# Patient Record
Sex: MALE | URBAN - METROPOLITAN AREA
[De-identification: names, ages, dates, MRNs, and addresses within clinical notes are randomized per-mention and may not be internally consistent; named-entity substitution may affect disease eponyms.]

---

## 2020-10-14 ENCOUNTER — OFFICE VISIT (OUTPATIENT)
Dept: URBAN - METROPOLITAN AREA CLINIC 16 | Facility: CLINIC | Age: 85
End: 2020-10-14
Payer: MEDICARE

## 2020-10-14 DIAGNOSIS — H25.11 AGE-RELATED NUCLEAR CATARACT, RIGHT EYE: Primary | ICD-10-CM

## 2020-10-14 DIAGNOSIS — H52.4 PRESBYOPIA: ICD-10-CM

## 2020-10-14 DIAGNOSIS — H35.349 MACULAR HOLE: ICD-10-CM

## 2020-10-14 DIAGNOSIS — Z96.1 PRESENCE OF INTRAOCULAR LENS: ICD-10-CM

## 2020-10-14 PROCEDURE — 92004 COMPRE OPH EXAM NEW PT 1/>: CPT | Performed by: OPTOMETRIST

## 2020-10-14 ASSESSMENT — VISUAL ACUITY
OS: 20/70
OD: 20/20

## 2020-10-14 ASSESSMENT — INTRAOCULAR PRESSURE
OD: 12
OS: 12

## 2020-10-14 NOTE — IMPRESSION/PLAN
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 Will continue to observe.

## 2020-10-14 NOTE — IMPRESSION/PLAN
Impression: Age-related nuclear cataract, right eye: H25.11. Plan: Discussed diagnosis. Will continue to observe.

## 2020-10-14 NOTE — IMPRESSION/PLAN
Impression: Presence of intraocular lens: Z96.1. Left. Plan: Discussed diagnosis. Will continue to observe.

## 2023-03-02 ENCOUNTER — OFFICE VISIT (OUTPATIENT)
Dept: URBAN - METROPOLITAN AREA CLINIC 26 | Facility: CLINIC | Age: 88
End: 2023-03-02
Payer: MEDICARE

## 2023-03-02 DIAGNOSIS — H04.123 DRY EYE SYNDROME OF BILATERAL LACRIMAL GLANDS: ICD-10-CM

## 2023-03-02 DIAGNOSIS — H35.349 MACULAR HOLE: ICD-10-CM

## 2023-03-02 DIAGNOSIS — H25.811 COMBINED FORMS OF AGE-RELATED CATARACT, RIGHT EYE: Primary | ICD-10-CM

## 2023-03-02 PROCEDURE — 99204 OFFICE O/P NEW MOD 45 MIN: CPT | Performed by: OPTOMETRIST

## 2023-03-02 PROCEDURE — 92134 CPTRZ OPH DX IMG PST SGM RTA: CPT | Performed by: OPTOMETRIST

## 2023-03-02 ASSESSMENT — INTRAOCULAR PRESSURE
OD: 13
OS: 15

## 2023-03-02 ASSESSMENT — VISUAL ACUITY
OD: 20/40
OS: 20/100

## 2023-03-02 NOTE — IMPRESSION/PLAN
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 Discussed diagnosis. Will continue to observe.

## 2023-03-02 NOTE — IMPRESSION/PLAN
Impression: Combined forms of age-related cataract, right eye: H25.811. Plan:  Discussed cataracts with patient. Discussed treatment options. Surgical treatment is recommended. Surgical risks and benefits discussed. Patient elects surgical treatment. Recommend surgery OD.  Patient is candidate/interested in standard lens,

## 2023-04-27 ENCOUNTER — TESTING ONLY (OUTPATIENT)
Dept: URBAN - METROPOLITAN AREA CLINIC 24 | Facility: CLINIC | Age: 88
End: 2023-04-27
Payer: MEDICARE

## 2023-04-27 DIAGNOSIS — Z01.818 ENCOUNTER FOR OTHER PREPROCEDURAL EXAMINATION: Primary | ICD-10-CM

## 2023-04-27 DIAGNOSIS — H25.811 COMBINED FORMS OF AGE-RELATED CATARACT, RIGHT EYE: Primary | ICD-10-CM

## 2023-04-27 PROCEDURE — 99203 OFFICE O/P NEW LOW 30 MIN: CPT | Performed by: PHYSICIAN ASSISTANT

## 2023-04-27 ASSESSMENT — PACHYMETRY
OS: 3.68
OS: 23.34
OD: 2.91
OD: 23.34

## 2023-05-03 ENCOUNTER — PRE-OPERATIVE VISIT (OUTPATIENT)
Dept: URBAN - METROPOLITAN AREA CLINIC 24 | Facility: CLINIC | Age: 88
End: 2023-05-03
Payer: MEDICARE

## 2023-05-03 DIAGNOSIS — H04.123 DRY EYE SYNDROME OF BILATERAL LACRIMAL GLANDS: ICD-10-CM

## 2023-05-03 DIAGNOSIS — H35.349 MACULAR HOLE: ICD-10-CM

## 2023-05-03 DIAGNOSIS — H25.811 COMBINED FORMS OF AGE-RELATED CATARACT, RIGHT EYE: Primary | ICD-10-CM

## 2023-05-03 PROCEDURE — 99204 OFFICE O/P NEW MOD 45 MIN: CPT | Performed by: OPHTHALMOLOGY

## 2023-05-03 ASSESSMENT — INTRAOCULAR PRESSURE
OS: 17
OD: 12

## 2023-05-03 NOTE — IMPRESSION/PLAN
Impression: Combined forms of age-related cataract, right eye: H25.811. Plan: Discussed cataract diagnosis with the patient. Discussed risks, benefits and alternatives to surgery including but not limited to: bleeding, infection, risk of vision loss, loss of the eye, need for other surgery. Patient voiced understanding and wishes to proceed. Patient elects surgical treatment. Advanced technology options Discussed with patient . Patient desires surgery OD, 2nd eye (( AIM -0.25 OD, injectable  1st choice/TRI MOXI, Topical anesthesia, NO Lensx, NO ORA, NO LRI, NO AMP)) Patient understands the need for glasses after surgery for BCVA.

## 2023-05-03 NOTE — IMPRESSION/PLAN
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 There is no evidence of retinal pathology. All signs and risks of retinal detachment and tears were discussed in detail. Patient instructed to call the office immediately if any symptoms noted. Recommend the patient return to office for follow up. Discussed with patient, understands this may limit vision after surgery.

## 2023-05-10 ENCOUNTER — SURGERY (OUTPATIENT)
Dept: URBAN - METROPOLITAN AREA SURGERY 12 | Facility: SURGERY | Age: 88
End: 2023-05-10
Payer: MEDICARE

## 2023-05-10 DIAGNOSIS — H25.811 COMBINED FORMS OF AGE-RELATED CATARACT, RIGHT EYE: Primary | ICD-10-CM

## 2023-05-10 PROCEDURE — 66984 XCAPSL CTRC RMVL W/O ECP: CPT | Performed by: OPHTHALMOLOGY

## 2023-05-11 ENCOUNTER — POST-OPERATIVE VISIT (OUTPATIENT)
Dept: URBAN - METROPOLITAN AREA CLINIC 26 | Facility: CLINIC | Age: 88
End: 2023-05-11
Payer: MEDICARE

## 2023-05-11 DIAGNOSIS — Z96.1 PRESENCE OF INTRAOCULAR LENS: Primary | ICD-10-CM

## 2023-05-11 PROCEDURE — 99024 POSTOP FOLLOW-UP VISIT: CPT | Performed by: OPTOMETRIST

## 2023-05-11 ASSESSMENT — INTRAOCULAR PRESSURE: OD: 22

## 2023-05-11 NOTE — IMPRESSION/PLAN
Impression: S/P Cataract Extraction/IOL (BDP) OD - 1 Day. Presence of intraocular lens  Z96.1.  Plan: monitor

## 2023-05-16 ENCOUNTER — POST-OPERATIVE VISIT (OUTPATIENT)
Dept: URBAN - METROPOLITAN AREA CLINIC 26 | Facility: CLINIC | Age: 88
End: 2023-05-16
Payer: MEDICARE

## 2023-05-16 DIAGNOSIS — H52.4 PRESBYOPIA: Primary | ICD-10-CM

## 2023-05-16 DIAGNOSIS — Z96.1 PRESENCE OF INTRAOCULAR LENS: ICD-10-CM

## 2023-05-16 PROCEDURE — 99024 POSTOP FOLLOW-UP VISIT: CPT | Performed by: OPTOMETRIST

## 2023-05-16 ASSESSMENT — VISUAL ACUITY
OD: 20/30
OS: 20/50

## 2023-05-16 ASSESSMENT — INTRAOCULAR PRESSURE
OS: 17
OD: 15

## 2023-05-16 NOTE — IMPRESSION/PLAN
Impression: S/P Cataract Extraction/IOL (BDP) OD - 6 Days. Presence of intraocular lens  Z96.1.  Plan: monitor
use art tears as needed OU
F/U 3 weeks

## 2023-06-08 ENCOUNTER — POST-OPERATIVE VISIT (OUTPATIENT)
Dept: URBAN - METROPOLITAN AREA CLINIC 26 | Facility: CLINIC | Age: 88
End: 2023-06-08
Payer: MEDICARE

## 2023-06-08 DIAGNOSIS — H52.4 PRESBYOPIA: Primary | ICD-10-CM

## 2023-06-08 DIAGNOSIS — Z96.1 PRESENCE OF INTRAOCULAR LENS: ICD-10-CM

## 2023-06-08 PROCEDURE — 99024 POSTOP FOLLOW-UP VISIT: CPT | Performed by: OPTOMETRIST

## 2023-06-08 ASSESSMENT — INTRAOCULAR PRESSURE
OS: 16
OD: 11

## 2023-06-08 ASSESSMENT — VISUAL ACUITY
OD: 20/25
OS: 20/70

## 2023-06-08 ASSESSMENT — KERATOMETRY
OS: 45.50
OD: 45.38

## 2023-06-08 NOTE — IMPRESSION/PLAN
Impression: S/P Cataract Extraction/IOL (BDP) OD - 29 Days. Presence of intraocular lens  Z96.1.  Plan: new glasses RX given today
recommend yearly eye exam